# Patient Record
Sex: MALE | Race: WHITE | NOT HISPANIC OR LATINO | Employment: OTHER | URBAN - METROPOLITAN AREA
[De-identification: names, ages, dates, MRNs, and addresses within clinical notes are randomized per-mention and may not be internally consistent; named-entity substitution may affect disease eponyms.]

---

## 2024-04-18 ENCOUNTER — HOSPITAL ENCOUNTER (OUTPATIENT)
Dept: RADIOLOGY | Facility: HOSPITAL | Age: 89
Discharge: HOME | End: 2024-04-18
Payer: MEDICARE

## 2024-04-18 DIAGNOSIS — R13.10 DYSPHAGIA: ICD-10-CM

## 2024-04-18 PROCEDURE — 74230 X-RAY XM SWLNG FUNCJ C+: CPT

## 2024-04-18 PROCEDURE — 92611 MOTION FLUOROSCOPY/SWALLOW: CPT

## 2024-04-18 NOTE — PROCEDURES
Video Swallow Study      Patient Name: Daniel Price  Today's Date: 4/18/2024        Past Medical History  No past medical history on file.     Past Surgical History  No past surgical history on file.    Modified (Video) Barium Swallow Study    Summary:  Images are on PACS for review.     Oral stage:WNL  Mastication, bolus control, formation, and transfer were WNL. Minimal posterior lingual residue w/ puree, nutrigrain bar, hard cookie, sandwich bite, and liquids. No premature spillage was observed.     Pharyngeal stage: Mild  Swallow initiation, velar closure, laryngeal elevation, anterior hyoid excursion, epiglottic inversion, and laryngeal vestibular closure WNL.  Minimally reduced pharyngeal constriction. At least mildly reduced  tongue base retraction. Slow epiglottic return following sandwich bite. Trace retropulsion w/ most trials in the UES from the CP area. ? Small prominence, view was limited.   Questionable posterior cricoid arch impression on series 17.   Moderate vallecular retention w/ puree. Mild vallecular retention w/ thin. Max vallecular retention w/ sandwich bite, and trace pyriform retention w/ nutrigrain bar and sandwich bite. Pt performed secondary swallows independently, though he stated he was unaware of retention.   Chin down did not appear to have any effect w/ pharyngeal clearance.   No aspiration or penetration, although pt coughed x 3 during the study.    Per gross esophageal screen:  Stasis through most of the esophagus w/ po trials. Retention throughout most of the esophagus following purees and solids. Partial clearance in the esophagus following 20 second delay w/ cued hard swallow. Residue cleared w/ liquid wash. The 13ml barium pill passed through to the stomach.     Recommendations:  Diet: Regular  Liquids: Thin  Meds: As tolerated  Strategies: Chew well, take small bites, add moisture to foods (gravy, sauce, condiments, broth), take sips of  "liquids after few bites of food, secondary swallows.   Frequent oral care  Upright position  Increase water intake (if ok medically). Pt admitted to reduced fluid intake other than 2 coffees.    F/u ST tx: Per referring SLP  Supervision: per primary SLP  Aspiration Precautions  Reflux Precautions:remain upright 1 hour after eating.   Consider consult with: Agree w/ ENT consult. Consider GI consult.   Results reviewed with: pt  Repeat MBS as necessary  If a dedicated assessment of the esophagus is desired: Consider or EGD     Pt is a 89 yom who was referred for VBS by family medicine/SLP. Pt c/o coughing when using voice excessively. Stated his voice \"doesn't want to come out sometimes\". He is currently receiving dysphagia therapy at assisted living facility. Per speech therapist, pt has \"inconsistent s/s aspiration w/ all consistencies and liquids.\" Pt denied any h/o pna's bronchitis, URI's. Denied any choking episodes. He stated he thinks he coughs more towards the end of the meal.     H&P/pertinent provider notes: (PMH noted above)  No history provided on pt. No information provided on EPIC other than ED Summary at [facility] for recent fall.     Special Studies:  CT Head 1/11/24:  IMPRESSION:   1. Chronic LEFT cerebellar lacunar infarct.   2. No acute intracranial hemorrhage.   3.  Spondylosis.   4. No evidence of acute traumatic osseous injury to the cervical spine.   Continue to follow the cervical spine trauma imaging protocol.     Previous MBS:  None known.  Currently receiving speech therapy services at [facility] for dysphagia.       Food allergies:  None known   Current diet:  Regular, nectar   Premorbid diet:  Regular, thin   Dentition:  Natural   O2 requirement:  None   Oral mech:  WNL   Vocal quality/speech:  C/o coughing when using voice too often, reduced volume.   Cognitive status:  Alert     Consistencies administered: Puree, nutrigrain bar, hard cookie, turkey sandwich bite, barium pill w/ " thin thin.    Pt was viewed seated laterally at 90 degrees. Unable to view AP.